# Patient Record
Sex: FEMALE | Race: WHITE | HISPANIC OR LATINO
[De-identification: names, ages, dates, MRNs, and addresses within clinical notes are randomized per-mention and may not be internally consistent; named-entity substitution may affect disease eponyms.]

---

## 2021-03-29 ENCOUNTER — APPOINTMENT (OUTPATIENT)
Dept: ORTHOPEDIC SURGERY | Facility: CLINIC | Age: 51
End: 2021-03-29
Payer: MEDICARE

## 2021-03-29 PROBLEM — Z00.00 ENCOUNTER FOR PREVENTIVE HEALTH EXAMINATION: Status: ACTIVE | Noted: 2021-03-29

## 2021-03-29 PROCEDURE — 73562 X-RAY EXAM OF KNEE 3: CPT | Mod: RT

## 2021-03-29 PROCEDURE — 99204 OFFICE O/P NEW MOD 45 MIN: CPT | Mod: 25

## 2021-03-29 PROCEDURE — 99072 ADDL SUPL MATRL&STAF TM PHE: CPT

## 2021-03-29 PROCEDURE — 20611 DRAIN/INJ JOINT/BURSA W/US: CPT | Mod: RT

## 2021-06-07 NOTE — PROCEDURE
[de-identified] : Patient to get a cortisone injection today. This does show conditions an ultrasound guidance. Patient tolerated the procedure well.

## 2021-06-07 NOTE — PHYSICAL EXAM
[de-identified] : Right knee on exam today patient is definite tenderness at the lateral joint some mild soft tissue swelling but no effusion range of motion 0-125° the knee is stable to AP stress varus valgus stress. Neurovascular distally. [de-identified] : AP standing individual on sunrise views were obtained showing a well-preserved joint space in all 3 compartments the exception of some mild narrowing of the lateral compartment of the right knee on the standing view.

## 2021-06-07 NOTE — HISTORY OF PRESENT ILLNESS
[de-identified] : First visit for this 50-year-old female with complaint of chronic retracted right knee pain it appears both medial and lateral joint lines more so on the lateral film. She presents with an outside MRI which shows a fairly substantial cartilage loss on the lateral femoral condyle.\par \par patient has failed physical therapy 4-6 weeks, cortisone injections x 3 months and we will begin gel injections for this patinet [Stable] : stable

## 2021-06-07 NOTE — DISCUSSION/SUMMARY
[de-identified] : Patient is going to be notified when the Orthovisc injections or delivered. We reviewed her options and wishes to candidate for an arthroscopic surgery would be replacement this point. We'll proceed with conservative measures - gel injections will be ordered.\par \par due to other failed conservative measures we plan on gel injections being successful.

## 2021-06-07 NOTE — REASON FOR VISIT
[FreeTextEntry2] : RIGHT KNEE PAIN AND SWELLING - HAD AN MRI DONE ON 03/05/21 - HAD SPINAL SURGERY DONE 2005

## 2021-06-29 ENCOUNTER — APPOINTMENT (OUTPATIENT)
Dept: ORTHOPEDIC SURGERY | Facility: CLINIC | Age: 51
End: 2021-06-29
Payer: MEDICARE

## 2021-06-29 PROCEDURE — 99213 OFFICE O/P EST LOW 20 MIN: CPT

## 2021-06-29 PROCEDURE — 99072 ADDL SUPL MATRL&STAF TM PHE: CPT

## 2021-06-29 NOTE — PHYSICAL EXAM
[de-identified] : Right knee\par \par Constitutional: \par The patient is healthy-appearing and in no apparent distress. \par \par Gait:\par The patient ambulates with a limp.\par \par Cardiovascular System: \par The capillary refill is less than 2 seconds. \par \par Skin: \par There are no skin abnormalities.  There is marked quadriceps atrophy on the RIGHT\par \par Right Knee: \par \par Bony Palpation: \par There is tenderness of the medial joint line. \par There is tenderness of the lateral joint line.\par There is tenderness of the medial femoral chondyle.\par There is tenderness of the lateral femoral chondyle.\par There is no tenderness of the tibial tubercle.\par There is no tenderness of the superior patella.\par There is no tenderness of the inferior patella.\par There is tenderness of the medial patellar facet.\par There is tenderness of the lateral patellar facet.\par \par Soft Tissue Palpation: \par There is no tenderness of the medial retinaculum.\par There is no tenderness of the lateral retinaculum.\par There is no tenderness of the quadriceps tendon.\par There is no tenderness of the patella tendon.\par There is no tenderness of the ITB.\par There is no tenderness of the pes anserine.\par \par Active Range of Motion: \par The range of motion at the knee actively and passively is full. \par \par Special Tests: \par There is a negative Apley.\par There is a negative Steinmanns. \par There is a negative Lachman and Anterior Drawer.\par There is a negative Posterior Drawer.  \par There is no varus or valgus laxity.\par \par Strength: \par There is 4-/5 hip flexion and 4/5 knee extension.  \par \par Psychiatric: \par The patient demonstrates a normal mood and affect and is active and alert. [de-identified] : Xray RIGHT knee ( March 2021 ):  There is mild medial and patellofemoral arthritis

## 2021-06-29 NOTE — HISTORY OF PRESENT ILLNESS
[de-identified] : Patient is a new patient coming in with complaints of right knee pain.  She reports being seen another physician 3 months ago which time she had a cortisone injection to the right knee which only helped temporarily.  She does report that she underwent major spinal surgery in the past with afterwards having right sided weakness.  She denies any recent fall or trauma.  Patient's present with her  for exam. Patient is using a walker to ambulate

## 2021-06-29 NOTE — ASSESSMENT
[FreeTextEntry1] : Discussed at length with patient exam consistent with patellofemoral pain and chondromalacia as well as a mild underlying arthritis.  Discussed this given the severity of her quadricep atrophy I would recommend EMG-NCV.

## 2021-07-20 ENCOUNTER — APPOINTMENT (OUTPATIENT)
Dept: ORTHOPEDIC SURGERY | Facility: CLINIC | Age: 51
End: 2021-07-20
Payer: MEDICARE

## 2021-07-20 PROCEDURE — 20610 DRAIN/INJ JOINT/BURSA W/O US: CPT | Mod: RT

## 2021-07-20 PROCEDURE — 99213 OFFICE O/P EST LOW 20 MIN: CPT | Mod: 25

## 2021-07-21 NOTE — ASSESSMENT
[FreeTextEntry1] : Discussed at length again with patient underlying arthritis of her knee and at this point I do not consider arthroscopic surgery indicated given the severity of arthritis as well as the marked atrophy of the right lower extremity.  Recommendation at this time as well for evaluation with our spine surgeon ( Dr. Castillo ) given her history of lumbar spine surgery and the atrophy on the right lower extremity secondary to her lower back issues

## 2021-07-21 NOTE — PROCEDURE
[de-identified] : Patient has demonstrated limited relief from NSAIDS, rest, exercises / PT, and after discussion of the risks and benefits, the patient has elected to proceed with a corticosteroid injection into the RIGHT knee(s) via an Anterolateral site.\par Confirmed that the patient does not have history of prior adverse reactions, active, infections, or relevant allergies.   There was no erythema or warmth, and the skin was clear.  The skin was sterilized with alcohol and via sterile technique, the knee was injected 3 cc of 1% xylocaine with 40 mg Kenalog.  The injection was completed without complication and a bandage was applied.  The patient tolerated the procedure well and was given post-injection instructions.\par

## 2021-07-21 NOTE — PHYSICAL EXAM
[de-identified] : Right knee\par \par Constitutional: \par The patient is healthy-appearing and in no apparent distress. \par \par Gait:\par The patient ambulates with a limp.\par \par Cardiovascular System: \par The capillary refill is less than 2 seconds. \par \par Skin: \par There are no skin abnormalities.  There is marked quadriceps atrophy on the RIGHT\par \par Right Knee: \par \par Bony Palpation: \par There is tenderness of the medial joint line. \par There is tenderness of the lateral joint line.\par There is tenderness of the medial femoral chondyle.\par There is tenderness of the lateral femoral chondyle.\par There is no tenderness of the tibial tubercle.\par There is no tenderness of the superior patella.\par There is no tenderness of the inferior patella.\par There is tenderness of the medial patellar facet.\par There is tenderness of the lateral patellar facet.\par \par Soft Tissue Palpation: \par There is no tenderness of the medial retinaculum.\par There is no tenderness of the lateral retinaculum.\par There is no tenderness of the quadriceps tendon.\par There is no tenderness of the patella tendon.\par There is no tenderness of the ITB.\par There is no tenderness of the pes anserine.\par \par Active Range of Motion: \par The range of motion at the knee actively and passively is full. \par \par Special Tests: \par There is a negative Apley.\par There is a negative Steinmanns. \par There is a negative Lachman and Anterior Drawer.\par There is a negative Posterior Drawer.  \par There is no varus or valgus laxity.\par \par Strength: \par There is 4-/5 hip flexion and 4/5 knee extension.  \par \par Psychiatric: \par The patient demonstrates a normal mood and affect and is active and alert.

## 2021-07-21 NOTE — HISTORY OF PRESENT ILLNESS
[de-identified] : Patient is an established patient seen by me 3 weeks ago with persistent right knee discomfort.  She was recommended for home exercises with therapy as well as consideration of viscous supplementation.  She reports the viscous supplementation has been denied.  She also reports going to see a physiatry pain management type clinic and was told to try a series of 5 gel shots and that her pain may be her meniscus.  Patient is present with her  her history and exam

## 2021-07-27 ENCOUNTER — APPOINTMENT (OUTPATIENT)
Dept: ORTHOPEDIC SURGERY | Facility: CLINIC | Age: 51
End: 2021-07-27
Payer: MEDICARE

## 2021-07-27 DIAGNOSIS — M54.16 RADICULOPATHY, LUMBAR REGION: ICD-10-CM

## 2021-07-27 DIAGNOSIS — M62.81 MUSCLE WEAKNESS (GENERALIZED): ICD-10-CM

## 2021-07-27 PROCEDURE — 72050 X-RAY EXAM NECK SPINE 4/5VWS: CPT

## 2021-07-27 PROCEDURE — 99214 OFFICE O/P EST MOD 30 MIN: CPT

## 2021-07-27 PROCEDURE — 72110 X-RAY EXAM L-2 SPINE 4/>VWS: CPT

## 2021-07-27 PROCEDURE — 99072 ADDL SUPL MATRL&STAF TM PHE: CPT

## 2021-07-27 PROCEDURE — 72070 X-RAY EXAM THORAC SPINE 2VWS: CPT

## 2021-07-27 NOTE — ASSESSMENT
[FreeTextEntry1] : 51 year old female referred by Dr. Baig for RLE muscle atrophy and weakness.  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  The patient reports that her right lower extremity weakness has progressed since that time.  She has visible muscle atrophy in her right lower extremity as well as her hands left worse than right.  She will be sent for cervical, thoracic and lumbar MRIs.  She is scheduled to see Dr. Tony Pavon a neurologist.  She will followup once her MRIs are completed.  We discussed red flag symptoms that would require emergent evaluation. She knows to call with any questions or concerns or if her symptoms acutely worsen.

## 2021-07-27 NOTE — HISTORY OF PRESENT ILLNESS
[de-identified] : 51 year old female referred by Dr. Baig for right lower extremity atrophy.  The patient has a complex spine history.  She had a staged cervical surgery at [place] in August of 2005.  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  Since this surgery she reports increased weakness in her legs and hands.  She last followed up at [place] around 2018.  She has poor baseline at baseline.  She denies recent illness, fevers, saddle anesthesia, urinary retention or fecal incontinence.  She denies neck pain.

## 2021-07-27 NOTE — PHYSICAL EXAM
[de-identified] : General: No acute distress, conversant, well-nourished.\par Head: Normocephalic, atraumatic\par Neck: trachea midline, FROM\par Heart: normotensive and normal rate and rhythm\par Lungs: No labored breathing\par Skin: No abrasions, no rashes, no edema\par Psych: Alert and oriented to person, place and time\par Extremities: no peripheral edema or digital cyanosis\par Gait: Poor balance. Uses gait aid.  \par Vascular: warm and well perfused distally, palpable distal pulses\par \par MSK:\par RLE: visible atrophy of quad muscle\par Bilateral UE: visible wasting of hand musculature more pronounced on left hand with wasting of intrinsics\par \par Cervical Spine: \par Incisions well healed\par \par NEURO:\par Sensation \par          Left           \par C5     2/2               \par C6     2/2               \par C7     2/2               \par C8     2/2              \par T1     2/2             \par \par          Right         \par C5     2/2               \par C6     2/2               \par C7     2/2               \par C8     2/2              \par T1     2/2      \par \par Motor: \par                                                Left             \par C5 (deltoid abduction)             5/5               \par C6 (biceps flexion)                   5/5                \par C7 (triceps extension)             5/5               \par C8 (finger flexion)                     4/5               \par T1 (interosseous)                     3/5           \par \par                                                Right           \par C5 (deltoid abduction)             5/5               \par C6 (biceps flexion)                   5/5                \par C7 (triceps extension)             5/5               \par C8 (finger flexion)                     4/5               \par T1 (interosseous)                     4/5                     \par \par Sensation \par Left L2  -  2/2            \par Left L3  -  2/2\par Left L4  -  2/2\par Left L5  -  2/2\par Left S1  -  2/2\par \par Right L2  -  2/2            \par Right L3  -  2/2\par Right L4  -  2/2\par Right L5  -  2/2\par Right S1  -  2/2\par \par Motor: \par Left L2 (hip flexion)                            5/5                \par Left L3 (knee extension)                   5/5                \par Left L4 (ankle dorsiflexion)                 5/5                \par Left L5 (long toe extensor)                5/5                \par Left S1 (ankle plantar flexion)           5/5\par \par Right L2 (hip flexion)                            4/5                \par Right L3 (knee extension)                   4/5                \par Right L4 (ankle dorsiflexion)                 4/5                \par Right L5 (long toe extensor)                4/5                \par Right S1 (ankle plantar flexion)           4/5 [de-identified] : I ordered cervical, thoracic and lumbar radiographs to evaluate the patient's symptoms.\par \par Cervical 4 view radiographs obtained in the office today shows no fracture or dislocation.  s/p Occiput-C5 posterior cervical fusion with no evidence of hardware complication or instability. \par \par Thoracic 2 views obtained in the office today shows no fracture or dislocation.  Moderate thoracolumbar scoliosis\par \par Lumbar 4 view radiographs obtained in the office today shows no fracture or dislocation.  Moderate thoracolumbar scoliosis.  Lumbar spondylosis.  No instability on dynamic series.  \par \par Cervical MRI (9/14/18): At C5-C6, there is a disc bulge and mild anterior thecal sac impingement. There is no foraminal impingement. The previously noted right paracentral herniation appears to have regressed.\par \par At C6-C7, there is a disc bulge and a right and left posterolateral herniation. There is severe right lateral recess and right foraminal impingement. There is mild anterior thecal sac impingement. There is also mild to moderate left lateral recess and left foraminal impingement. This is stable.\par \par Diffuse atrophy of the cervical cord and upper thoracic cord.\par \par Status post prior odontoid resection and posterior cervical fusion

## 2021-08-06 ENCOUNTER — APPOINTMENT (OUTPATIENT)
Dept: ORTHOPEDIC SURGERY | Facility: CLINIC | Age: 51
End: 2021-08-06
Payer: MEDICARE

## 2021-08-06 PROCEDURE — 99214 OFFICE O/P EST MOD 30 MIN: CPT

## 2021-08-06 NOTE — PHYSICAL EXAM
[de-identified] : General: No acute distress, conversant, well-nourished.\par Head: Normocephalic, atraumatic\par Neck: trachea midline, FROM\par Heart: normotensive and normal rate and rhythm\par Lungs: No labored breathing\par Skin: No abrasions, no rashes, no edema\par Psych: Alert and oriented to person, place and time\par Extremities: no peripheral edema or digital cyanosis\par Gait: Poor balance. Uses gait aid.  \par Vascular: warm and well perfused distally, palpable distal pulses\par \par MSK:\par RLE: visible atrophy of quad muscle\par Bilateral UE: visible wasting of hand musculature more pronounced on left hand with wasting of intrinsics\par \par Cervical Spine: \par Incisions well healed\par \par NEURO:\par Sensation \par          Left           \par C5     2/2               \par C6     2/2               \par C7     2/2               \par C8     2/2              \par T1     2/2             \par \par          Right         \par C5     2/2               \par C6     2/2               \par C7     2/2               \par C8     2/2              \par T1     2/2      \par \par Motor: \par                                                Left             \par C5 (deltoid abduction)             5/5               \par C6 (biceps flexion)                   5/5                \par C7 (triceps extension)             5/5               \par C8 (finger flexion)                     4/5               \par T1 (interosseous)                     3/5           \par \par                                                Right           \par C5 (deltoid abduction)             5/5               \par C6 (biceps flexion)                   5/5                \par C7 (triceps extension)             5/5               \par C8 (finger flexion)                     4/5               \par T1 (interosseous)                     4/5                     \par \par Sensation \par Left L2  -  2/2            \par Left L3  -  2/2\par Left L4  -  2/2\par Left L5  -  2/2\par Left S1  -  2/2\par \par Right L2  -  2/2            \par Right L3  -  2/2\par Right L4  -  2/2\par Right L5  -  2/2\par Right S1  -  2/2\par \par Motor: \par Left L2 (hip flexion)                            5/5                \par Left L3 (knee extension)                   5/5                \par Left L4 (ankle dorsiflexion)                 5/5                \par Left L5 (long toe extensor)                5/5                \par Left S1 (ankle plantar flexion)           5/5\par \par Right L2 (hip flexion)                            4/5                \par Right L3 (knee extension)                   4/5                \par Right L4 (ankle dorsiflexion)                 4/5                \par Right L5 (long toe extensor)                4/5                \par Right S1 (ankle plantar flexion)           4/5 [de-identified] : Cervical MRI (7/31/21): 1. No neural change when compared to the prior study.\par 2. Multinodular goiter.\par 3. Long segment of cord signal abnormality extending to involve the thoracic cord which is partially outside the field-of-view in a pattern suggestive of old infarct.\par 4. Levoscoliosis.\par 5. Grade 1 retrolisthesis of C3 on C4 and C6 on C7.\par 6. Grade 1 anterolisthesis of C5 on C6.\par 7. C3/4 level left-sided facet hypertrophy resulting in mild left neuroforaminal narrowing without central canal narrowing.\par 8. C5/6 level disc bulge with posterior disc herniation and facet hypertrophy resulting in mild central with moderate left neuroforaminal narrowing which is increased when compared to the prior study.\par 9. C6/7 level  disc bulge and facet hypertrophy resulting in mild central with moderate severe right and moderate left neuroforaminal narrowing.\par \par Thoracic MRI (7/31/21): 1. Increase cord signal abnormality extending inferiorly to the level of the T9 vertebra. The pattern is most suggestive of an old ischemic event. Correlation may be obtained with a contrast enhanced MRI to exclude underlying enhancement.\par 2. Levoscoliosis.\par 3. No disc bulge or disc herniation. No central canal or neuroforaminal narrowing.\par 4. Abnormal right kidney which appears atrophic. Correlate with a dedicated ultrasound.\par \par Lumbar MRI  (7/31/21): 1. Motion artifact degrades the quality of the exam.\par 2. Interval increase in size of the right foraminal disc herniation at L3-L4 level displacing the right intraforaminal and post foraminal L3 nerve root with a right neuroforaminal stenosis and mild spinal stenosis.\par 3. Mild neuroforaminal stenosis at L4-L5 and L5-S1 levels with disc bulging.\par 4. Scoliotic curvature to the lumbar spine\par \par Cervical 4 view radiographs (7/27/21) no fracture or dislocation.  s/p Occiput-C5 posterior cervical fusion with no evidence of hardware complication or instability. \par \par Thoracic 2 views (7/27/21) no fracture or dislocation.  Moderate thoracolumbar scoliosis\par \par Lumbar 4 view radiographs (7/27/21) no fracture or dislocation.  Moderate thoracolumbar scoliosis.  Lumbar spondylosis.  No instability on dynamic series.  \par \par Cervical MRI (9/14/18): At C5-C6, there is a disc bulge and mild anterior thecal sac impingement. There is no foraminal impingement. The previously noted right paracentral herniation appears to have regressed.\par \par At C6-C7, there is a disc bulge and a right and left posterolateral herniation. There is severe right lateral recess and right foraminal impingement. There is mild anterior thecal sac impingement. There is also mild to moderate left lateral recess and left foraminal impingement. This is stable.\par \par Diffuse atrophy of the cervical cord and upper thoracic cord.\par \par Status post prior odontoid resection and posterior cervical fusion

## 2021-08-06 NOTE — HISTORY OF PRESENT ILLNESS
[de-identified] : 51 year old female followup for right lower extremity atrophy and extremity weakness.  The patient has a complex spine history.  She had a staged cervical surgery at [facility] in August of 2005.  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  Since this surgery she reports increased weakness in her legs and hands. She has poor baseline at baseline.  She denies recent illness, fevers, saddle anesthesia, urinary retention or fecal incontinence.  She is here to review her whole spine MRIs.

## 2021-08-06 NOTE — ASSESSMENT
[FreeTextEntry1] : 51 year old female followup for RLE muscle atrophy and weakness.  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  The patients weakness and neurologic exam has remained unchanged since her last exam.  We reviewed her whole spine MRI.  No evidence of spinal cord compression.  There is diffuse cord signal change in the cervicothoracic junction likely chronic cord signal change. No indication for revision spine surgery. She does have a right L3-L4 disc herniation however her lower extremity weakness is more likely related to her chronic cord injury.  She will be seeing Dr. Tony Pavon a neurologist.  She will followup after seeing Dr. Pavon.   We discussed red flag symptoms that would require emergent evaluation. She knows to call with any questions or concerns or if her symptoms acutely worsen.

## 2021-09-23 ENCOUNTER — NON-APPOINTMENT (OUTPATIENT)
Age: 51
End: 2021-09-23

## 2021-09-23 ENCOUNTER — APPOINTMENT (OUTPATIENT)
Dept: NEUROLOGY | Facility: CLINIC | Age: 51
End: 2021-09-23

## 2021-09-23 ENCOUNTER — APPOINTMENT (OUTPATIENT)
Dept: NEUROLOGY | Facility: CLINIC | Age: 51
End: 2021-09-23
Payer: MEDICARE

## 2021-09-23 VITALS
DIASTOLIC BLOOD PRESSURE: 70 MMHG | SYSTOLIC BLOOD PRESSURE: 103 MMHG | BODY MASS INDEX: 20.33 KG/M2 | HEART RATE: 67 BPM | RESPIRATION RATE: 14 BRPM | HEIGHT: 65 IN | OXYGEN SATURATION: 96 % | WEIGHT: 122 LBS | TEMPERATURE: 98.1 F

## 2021-09-23 DIAGNOSIS — G95.9 DISEASE OF SPINAL CORD, UNSPECIFIED: ICD-10-CM

## 2021-09-23 PROCEDURE — 95886 MUSC TEST DONE W/N TEST COMP: CPT

## 2021-09-23 PROCEDURE — 95911 NRV CNDJ TEST 9-10 STUDIES: CPT

## 2021-09-23 PROCEDURE — 99204 OFFICE O/P NEW MOD 45 MIN: CPT | Mod: 25

## 2021-09-23 RX ORDER — LEVOTHYROXINE SODIUM 0.1 MG/1
100 TABLET ORAL
Qty: 30 | Refills: 0 | Status: DISCONTINUED | COMMUNITY
Start: 2021-02-18 | End: 2021-09-23

## 2021-09-23 RX ORDER — MOMETASONE FUROATE AND FORMOTEROL FUMARATE DIHYDRATE 200; 5 UG/1; UG/1
200-5 AEROSOL RESPIRATORY (INHALATION)
Qty: 13 | Refills: 0 | Status: DISCONTINUED | COMMUNITY
Start: 2021-03-26 | End: 2021-09-23

## 2021-09-23 RX ORDER — AZITHROMYCIN 200 MG/5ML
200 POWDER, FOR SUSPENSION ORAL
Qty: 30 | Refills: 0 | Status: DISCONTINUED | COMMUNITY
Start: 2020-11-11 | End: 2021-09-23

## 2021-09-23 RX ORDER — AMOXICILLIN AND CLAVULANATE POTASSIUM 600; 42.9 MG/5ML; MG/5ML
600-42.9 FOR SUSPENSION ORAL
Qty: 125 | Refills: 0 | Status: DISCONTINUED | COMMUNITY
Start: 2020-09-29 | End: 2021-09-23

## 2021-09-23 RX ORDER — HYALURONATE SODIUM 30 MG/2 ML
30 SYRINGE (ML) INTRAARTICULAR
Qty: 3 | Refills: 0 | Status: DISCONTINUED | OUTPATIENT
Start: 2021-03-29 | End: 2021-09-23

## 2021-09-23 RX ORDER — HYALURONATE SODIUM 30 MG/2 ML
30 SYRINGE (ML) INTRAARTICULAR
Qty: 3 | Refills: 0 | Status: DISCONTINUED | OUTPATIENT
Start: 2021-06-07 | End: 2021-09-23

## 2021-09-23 NOTE — HISTORY OF PRESENT ILLNESS
[FreeTextEntry1] : Referred by Dr. Castillo and Dr. Baig for right leg weakness and atrophy \par She had cervical spine surgery in 2005, since then has had right sided weakness - hand and right leg\par She's not sure if weakness has gotten worse, but she is having more pain in the right knee \par She denies low back pain or radiating pain into the leg, although she did have some radiating pain in right leg about 2 years ago \par \par Personally reviewed and interpreted:\par MRI total spine - severe atrophy of cervical and thoracic cord, central / anterior signal abnormality extending from upper cervical to lower thoracic area; disc herniation at L3/4 on the right displacing the exiting right L3 nerve root

## 2021-09-23 NOTE — ASSESSMENT
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

## 2021-09-23 NOTE — PROCEDURE
[FreeTextEntry1] : \par Nerve Conduction and Electromyography Report [FreeTextEntry3] : \par Electro Physiologic Findings:\par \par Limb temperature was monitored and maintained at approximately 30 – 34° C in the lower extremities, and 32 – 36° C in the upper extremities. \par \par The right radial and bilateral superficial fibular sensory responses were normal. The right ulnar and median motor responses were normal. The right fibular motor response was absent, while on the left it was normal. The right tibial motor amplitude was within normal limits, but less than 50% that of the left side. The left fibular F-waves were mildly prolonged, while the right median, right ulnar and bilateral tibial F-wave latencies were normal. \par \par Needle electromyography was performed on select right lower extremity L2-S1 appendicular muscles and the right L3/4 paraspinal. There was mildly increased insertional activity at the L3/4 paraspinal, without sustained spontaneous activity. The other muscles tested were normal. \par \par Clinical Electrophysiological Impression: \par  \par This electrodiagnostic study demonstrated some mild abnormalities most likely related to the patient’s history of spinal cord infarct. There was no definite evidence of polyneuropathy or right lumbar radiculopathy.

## 2021-09-23 NOTE — PHYSICAL EXAM
[FreeTextEntry1] : Motor:\par RUE Del, bic, tric 4+, finger ext 4, finger abduction 4-, thumb abduction 4-\par LUE finger ext 4+, fing abd 4-, thumb abd 4\par RLE hip flex 4-, knee ext 4, knee flex 4, ankle dorsi 4\par LLE hip flex 4, knee ext 5, knee flex 4+, ankle dorsi 4+ \par \par Sensory:\par vibration mildly reduced at toes b/l\par Pinprick, cold sensation symmetric in LE b/l \par \par Reflexes: \par UE 1+ b/l, no Lock's sign\par LE 1+ R patellar, otherwise absent \par

## 2021-09-23 NOTE — CONSULT LETTER
[Dear  ___] : Dear  [unfilled], [Consult Letter:] : I had the pleasure of evaluating your patient, [unfilled]. [Please see my note below.] : Please see my note below. [Consult Closing:] : Thank you very much for allowing me to participate in the care of this patient.  If you have any questions, please do not hesitate to contact me. [Sincerely,] : Sincerely, [FreeTextEntry3] : Tony Pavon M.D.\par Neurology, Electromyography and Neuromuscular Medicine\par [facility]\par \par  of Neurology\par \[facility]\"" / [facility] [employer] [DrMushtaq  ___] : Dr. GIRON

## 2021-09-23 NOTE — CONSULT LETTER
[Dear  ___] : Dear  [unfilled], [Consult Letter:] : I had the pleasure of evaluating your patient, [unfilled]. [Please see my note below.] : Please see my note below. [Consult Closing:] : Thank you very much for allowing me to participate in the care of this patient.  If you have any questions, please do not hesitate to contact me. [Sincerely,] : Sincerely, [FreeTextEntry3] : Tony Pavon M.D.\par Neurology, Electromyography and Neuromuscular Medicine\par [facility]\par \par  of Neurology\par [facility] [employer] [DrMushtaq  ___] : Dr. GIRON

## 2021-10-05 ENCOUNTER — APPOINTMENT (OUTPATIENT)
Dept: ORTHOPEDIC SURGERY | Facility: CLINIC | Age: 51
End: 2021-10-05
Payer: MEDICARE

## 2021-10-05 DIAGNOSIS — G95.11 ACUTE INFARCTION OF SPINAL CORD (EMBOLIC) (NONEMBOLIC): ICD-10-CM

## 2021-10-05 PROCEDURE — 99213 OFFICE O/P EST LOW 20 MIN: CPT

## 2021-10-05 NOTE — ASSESSMENT
[FreeTextEntry1] : Discussed with patient spinal cord injury as well as EMG and this time given minimal knee discomfort I would recommend continued home exercises and observation.  Patient is aware of increased pain her concern consideration of repeat injection but ultimately she may require total knee replacement.  Patient to follow up at on an as-needed basis

## 2021-10-05 NOTE — PHYSICAL EXAM
[de-identified] : Right knee\par \par Constitutional: \par The patient is healthy-appearing and in no apparent distress. \par \par Gait:\par The patient ambulates with a limp and cane assist.\par \par Cardiovascular System: \par The capillary refill is less than 2 seconds. \par \par Skin: \par There are no skin abnormalities.  There is marked quadriceps atrophy on the RIGHT\par \par Right Knee: \par \par Bony Palpation: \par There is tenderness of the medial joint line. \par There is tenderness of the lateral joint line.\par There is tenderness of the medial femoral chondyle.\par There is tenderness of the lateral femoral chondyle.\par There is no tenderness of the tibial tubercle.\par There is no tenderness of the superior patella.\par There is no tenderness of the inferior patella.\par There is tenderness of the medial patellar facet.\par There is tenderness of the lateral patellar facet.\par \par Soft Tissue Palpation: \par There is no tenderness of the medial retinaculum.\par There is no tenderness of the lateral retinaculum.\par There is no tenderness of the quadriceps tendon.\par There is no tenderness of the patella tendon.\par There is no tenderness of the ITB.\par There is no tenderness of the pes anserine.\par \par Active Range of Motion: \par The range of motion at the knee actively and passively is full. \par \par Strength: \par There is 4-/5 hip flexion and 4/5 knee extension.  \par \par Psychiatric: \par The patient demonstrates a normal mood and affect and is active and alert.

## 2021-10-05 NOTE — HISTORY OF PRESENT ILLNESS
[de-identified] : Patient is an established patient last seen back in July at the time having right knee discomfort and swelling.  She currently states she is doing rather well not having significant knee discomfort is following up for further evaluation and discussion regards to her EMG.  \par

## 2022-03-15 ENCOUNTER — APPOINTMENT (OUTPATIENT)
Dept: ORTHOPEDIC SURGERY | Facility: CLINIC | Age: 52
End: 2022-03-15
Payer: MEDICARE

## 2022-03-15 PROCEDURE — 20610 DRAIN/INJ JOINT/BURSA W/O US: CPT | Mod: RT

## 2022-03-15 PROCEDURE — 99213 OFFICE O/P EST LOW 20 MIN: CPT | Mod: 25

## 2022-03-15 NOTE — HISTORY OF PRESENT ILLNESS
[de-identified] : Patient is an established patient last seen in [place] at the time having right knee discomfort and swelling.  She states increasded atraumatic pain over the last few weeks.

## 2022-03-15 NOTE — ASSESSMENT
[FreeTextEntry1] : Re-discussed with patient spinal cord injury and RLE atrophy.  Patient re-expresses understanding and elects right knee injection.   Patient to follow up at on an as-needed basis

## 2022-03-15 NOTE — PHYSICAL EXAM
[de-identified] : Right knee\par \par Constitutional: \par The patient is healthy-appearing and in no apparent distress. \par \par Gait:\par The patient ambulates with a limp and cane assist.\par \par Cardiovascular System: \par The capillary refill is less than 2 seconds. \par \par Skin: \par There are no skin abnormalities.  There is marked quadriceps atrophy on the RIGHT\par \par Right Knee: \par \par Bony Palpation: \par There is tenderness of the medial joint line. \par There is tenderness of the lateral joint line.\par There is tenderness of the medial femoral chondyle.\par There is tenderness of the lateral femoral chondyle.\par There is no tenderness of the tibial tubercle.\par There is no tenderness of the superior patella.\par There is no tenderness of the inferior patella.\par There is tenderness of the medial patellar facet.\par There is tenderness of the lateral patellar facet.\par \par Soft Tissue Palpation: \par There is no tenderness of the medial retinaculum.\par There is no tenderness of the lateral retinaculum.\par There is no tenderness of the quadriceps tendon.\par There is no tenderness of the patella tendon.\par There is no tenderness of the ITB.\par There is no tenderness of the pes anserine.\par \par Active Range of Motion: \par The range of motion at the knee actively and passively is full. \par \par Strength: \par There is 4-/5 hip flexion and 4/5 knee extension.  \par \par Psychiatric: \par The patient demonstrates a normal mood and affect and is active and alert.

## 2022-03-15 NOTE — PROCEDURE
[de-identified] : Patient has demonstrated limited relief from NSAIDS, rest, exercises / PT, and after discussion of the risks and benefits, the patient has elected to proceed with a corticosteroid injection into the RIGHT knee via an Anterolateral site.\par Confirmed that the patient does not have history of prior adverse reactions, active, infections, or relevant allergies.   There was no erythema or warmth, and the skin was clear.  The skin was sterilized with alcohol and via sterile technique, the knee was injected 3 cc of 1% xylocaine with 40 mg Kenalog.  The injection was completed without complication and a bandage was applied.  The patient tolerated the procedure well and was given post-injection instructions.\par

## 2022-05-10 DIAGNOSIS — M94.269 CHONDROMALACIA, UNSPECIFIED KNEE: ICD-10-CM

## 2023-01-04 ENCOUNTER — APPOINTMENT (OUTPATIENT)
Dept: ORTHOPEDIC SURGERY | Facility: CLINIC | Age: 53
End: 2023-01-04
Payer: MEDICARE

## 2023-01-04 DIAGNOSIS — M23.91 UNSPECIFIED INTERNAL DERANGEMENT OF RIGHT KNEE: ICD-10-CM

## 2023-01-04 PROCEDURE — 99213 OFFICE O/P EST LOW 20 MIN: CPT

## 2023-01-04 NOTE — ASSESSMENT
[FreeTextEntry1] : Re-discussed with patient spinal cord injury and RLE atrophy.  Patient states increased pain and would like MRI evaluation given persistent symptoms.

## 2023-01-04 NOTE — PHYSICAL EXAM
[de-identified] : Right knee\par \par Constitutional: \par The patient is healthy-appearing and in no apparent distress. \par \par Gait:\par The patient ambulates with a limp and cane assist.\par \par Cardiovascular System: \par The capillary refill is less than 2 seconds. \par \par Skin: \par There are no skin abnormalities.  There is marked quadriceps atrophy on the RIGHT\par \par Right Knee: \par \par Bony Palpation: \par There is tenderness of the medial joint line. \par There is tenderness of the lateral joint line.\par There is tenderness of the medial femoral chondyle.\par There is tenderness of the lateral femoral chondyle.\par There is no tenderness of the tibial tubercle.\par There is no tenderness of the superior patella.\par There is no tenderness of the inferior patella.\par There is tenderness of the medial patellar facet.\par There is tenderness of the lateral patellar facet.\par \par Soft Tissue Palpation: \par There is no tenderness of the medial retinaculum.\par There is no tenderness of the lateral retinaculum.\par There is no tenderness of the quadriceps tendon.\par There is no tenderness of the patella tendon.\par There is no tenderness of the ITB.\par There is no tenderness of the pes anserine.\par \par Active Range of Motion: \par The range of motion at the knee actively and passively is full. \par \par Strength: \par There is 4-/5 hip flexion and 4/5 knee extension.  \par \par Psychiatric: \par The patient demonstrates a normal mood and affect and is active and alert.

## 2023-07-05 ENCOUNTER — APPOINTMENT (OUTPATIENT)
Dept: ORTHOPEDIC SURGERY | Facility: CLINIC | Age: 53
End: 2023-07-05
Payer: MEDICARE

## 2023-07-05 DIAGNOSIS — M17.11 UNILATERAL PRIMARY OSTEOARTHRITIS, RIGHT KNEE: ICD-10-CM

## 2023-07-05 DIAGNOSIS — M62.58 MUSCLE WASTING AND ATROPHY, NOT ELSEWHERE CLASSIFIED, OTHER SITE: ICD-10-CM

## 2023-07-05 PROCEDURE — 99213 OFFICE O/P EST LOW 20 MIN: CPT

## 2023-07-05 NOTE — ASSESSMENT
[FreeTextEntry1] : Offered again with patient cortisone injection as well is physical therapy with patient this time only elects PT

## 2023-07-05 NOTE — PHYSICAL EXAM
[de-identified] : Right knee\par \par Constitutional: \par The patient is healthy-appearing and in no apparent distress. \par \par Gait:\par The patient ambulates with a limp and cane assist.\par \par Cardiovascular System: \par The capillary refill is less than 2 seconds. \par \par Skin: \par There are no skin abnormalities.  There is marked quadriceps atrophy on the RIGHT\par \par Right Knee: \par \par Bony Palpation: \par There is tenderness of the medial joint line. \par There is tenderness of the lateral joint line.\par There is tenderness of the medial femoral chondyle.\par There is tenderness of the lateral femoral chondyle.\par There is no tenderness of the tibial tubercle.\par There is no tenderness of the superior patella.\par There is no tenderness of the inferior patella.\par There is tenderness of the medial patellar facet.\par There is tenderness of the lateral patellar facet.\par \par Soft Tissue Palpation: \par There is no tenderness of the medial retinaculum.\par There is no tenderness of the lateral retinaculum.\par There is no tenderness of the quadriceps tendon.\par There is no tenderness of the patella tendon.\par There is no tenderness of the ITB.\par There is no tenderness of the pes anserine.\par \par Active Range of Motion: \par The range of motion at the knee actively and passively is full. \par \par Strength: \par There is 4-/5 hip flexion and 4/5 knee extension.  \par \par Psychiatric: \par The patient demonstrates a normal mood and affect and is active and alert.

## 2023-07-05 NOTE — HISTORY OF PRESENT ILLNESS
[de-identified] : Patient is an established patient last seen back in January presenting with persistent discomfort to the right knee.  She does have a history of spinal cord atrophy and subsequent right extremity atrophy

## 2024-01-17 ENCOUNTER — APPOINTMENT (OUTPATIENT)
Dept: ORTHOPEDIC SURGERY | Facility: CLINIC | Age: 54
End: 2024-01-17

## 2024-01-22 ENCOUNTER — APPOINTMENT (OUTPATIENT)
Dept: ORTHOPEDIC SURGERY | Facility: CLINIC | Age: 54
End: 2024-01-22
Payer: MEDICARE

## 2024-01-22 VITALS — HEIGHT: 65 IN | WEIGHT: 120 LBS | BODY MASS INDEX: 19.99 KG/M2

## 2024-01-22 DIAGNOSIS — M62.551 MUSCLE WASTING AND ATROPHY, NOT ELSEWHERE CLASSIFIED, RIGHT THIGH: ICD-10-CM

## 2024-01-22 DIAGNOSIS — M17.10 UNILATERAL PRIMARY OSTEOARTHRITIS, UNSPECIFIED KNEE: ICD-10-CM

## 2024-01-22 PROCEDURE — 99213 OFFICE O/P EST LOW 20 MIN: CPT

## 2024-01-22 NOTE — HISTORY OF PRESENT ILLNESS
[de-identified] : Last visit:7/5/2023 Reason: Right knee pain  Symptoms: no swelling / no stiffness/ sharp pain / radiating pain from knee to shin Pain Level: 9/10 Physical therapy - unable to finish due to expenses / doing home exercises - not much improvement.

## 2024-01-22 NOTE — PHYSICAL EXAM
[de-identified] : Right knee\par  \par  Constitutional: \par  The patient is healthy-appearing and in no apparent distress. \par  \par  Gait:\par  The patient ambulates with a limp and cane assist.\par  \par  Cardiovascular System: \par  The capillary refill is less than 2 seconds. \par  \par  Skin: \par  There are no skin abnormalities.  There is marked quadriceps atrophy on the RIGHT\par  \par  Right Knee: \par  \par  Bony Palpation: \par  There is tenderness of the medial joint line. \par  There is tenderness of the lateral joint line.\par  There is tenderness of the medial femoral chondyle.\par  There is tenderness of the lateral femoral chondyle.\par  There is no tenderness of the tibial tubercle.\par  There is no tenderness of the superior patella.\par  There is no tenderness of the inferior patella.\par  There is tenderness of the medial patellar facet.\par  There is tenderness of the lateral patellar facet.\par  \par  Soft Tissue Palpation: \par  There is no tenderness of the medial retinaculum.\par  There is no tenderness of the lateral retinaculum.\par  There is no tenderness of the quadriceps tendon.\par  There is no tenderness of the patella tendon.\par  There is no tenderness of the ITB.\par  There is no tenderness of the pes anserine.\par  \par  Active Range of Motion: \par  The range of motion at the knee actively and passively is full. \par  \par  Strength: \par  There is 4-/5 hip flexion and 4/5 knee extension.  \par  \par  Psychiatric: \par  The patient demonstrates a normal mood and affect and is active and alert.

## 2024-01-22 NOTE — ASSESSMENT
[FreeTextEntry1] : Discussed again with patient severity of spinal cord lesion atrophy of the right lower extremity and treatment options at this time she elects home exercises physical therapy offered cortisone injection of the knee but declined

## 2025-03-06 ENCOUNTER — APPOINTMENT (OUTPATIENT)
Dept: ORTHOPEDIC SURGERY | Facility: CLINIC | Age: 55
End: 2025-03-06
Payer: MEDICARE

## 2025-03-06 DIAGNOSIS — M62.551 MUSCLE WASTING AND ATROPHY, NOT ELSEWHERE CLASSIFIED, RIGHT THIGH: ICD-10-CM

## 2025-03-06 DIAGNOSIS — M17.11 UNILATERAL PRIMARY OSTEOARTHRITIS, RIGHT KNEE: ICD-10-CM

## 2025-03-06 PROCEDURE — 99213 OFFICE O/P EST LOW 20 MIN: CPT

## 2025-06-26 ENCOUNTER — NON-APPOINTMENT (OUTPATIENT)
Age: 55
End: 2025-06-26

## 2025-06-26 ENCOUNTER — APPOINTMENT (OUTPATIENT)
Dept: ORTHOPEDIC SURGERY | Facility: CLINIC | Age: 55
End: 2025-06-26
Payer: MEDICARE

## 2025-06-26 PROCEDURE — 99213 OFFICE O/P EST LOW 20 MIN: CPT
